# Patient Record
Sex: MALE | Race: WHITE | Employment: FULL TIME | URBAN - NONMETROPOLITAN AREA
[De-identification: names, ages, dates, MRNs, and addresses within clinical notes are randomized per-mention and may not be internally consistent; named-entity substitution may affect disease eponyms.]

---

## 2020-10-22 ENCOUNTER — HOSPITAL ENCOUNTER (OUTPATIENT)
Dept: GENERAL RADIOLOGY | Age: 35
Discharge: HOME | End: 2020-10-22
Payer: OTHER GOVERNMENT

## 2020-10-22 ENCOUNTER — HOSPITAL ENCOUNTER (OUTPATIENT)
Age: 35
Discharge: HOME | End: 2020-10-22
Payer: OTHER GOVERNMENT

## 2020-10-22 LAB
ANION GAP SERPL CALCULATED.3IONS-SCNC: 8 MEQ/L (ref 8–16)
APTT: 29.8 SECONDS (ref 22–38)
BUN BLDV-MCNC: 18 MG/DL (ref 7–22)
CALCIUM SERPL-MCNC: 10.5 MG/DL (ref 8.5–10.5)
CHLORIDE BLD-SCNC: 100 MEQ/L (ref 98–111)
CO2: 28 MEQ/L (ref 23–33)
CREAT SERPL-MCNC: 0.9 MG/DL (ref 0.4–1.2)
EKG ATRIAL RATE: 53 BPM
EKG P AXIS: -34 DEGREES
EKG P-R INTERVAL: 136 MS
EKG Q-T INTERVAL: 418 MS
EKG QRS DURATION: 122 MS
EKG QTC CALCULATION (BAZETT): 392 MS
EKG R AXIS: 94 DEGREES
EKG T AXIS: 8 DEGREES
EKG VENTRICULAR RATE: 53 BPM
ERYTHROCYTE [DISTWIDTH] IN BLOOD BY AUTOMATED COUNT: 13 % (ref 11.5–14.5)
ERYTHROCYTE [DISTWIDTH] IN BLOOD BY AUTOMATED COUNT: 43.2 FL (ref 35–45)
GFR SERPL CREATININE-BSD FRML MDRD: > 90 ML/MIN/1.73M2
GLUCOSE BLD-MCNC: 99 MG/DL (ref 70–108)
HCT VFR BLD CALC: 48.4 % (ref 42–52)
HEMOGLOBIN: 16 GM/DL (ref 14–18)
INR BLD: 1 (ref 0.85–1.13)
MCH RBC QN AUTO: 30.1 PG (ref 26–33)
MCHC RBC AUTO-ENTMCNC: 33.1 GM/DL (ref 32.2–35.5)
MCV RBC AUTO: 91.1 FL (ref 80–94)
MRSA NASAL SCREEN RT-PCR: NEGATIVE
PLATELET # BLD: 210 THOU/MM3 (ref 130–400)
PMV BLD AUTO: 11.8 FL (ref 9.4–12.4)
POTASSIUM SERPL-SCNC: 4.8 MEQ/L (ref 3.5–5.2)
RBC # BLD: 5.31 MILL/MM3 (ref 4.7–6.1)
SODIUM BLD-SCNC: 136 MEQ/L (ref 135–145)
STAPH AUREUS SCREEN RT-PCR: POSITIVE
WBC # BLD: 5.6 THOU/MM3 (ref 4.8–10.8)

## 2020-10-22 PROCEDURE — 85027 COMPLETE CBC AUTOMATED: CPT

## 2020-10-22 PROCEDURE — 36415 COLL VENOUS BLD VENIPUNCTURE: CPT

## 2020-10-22 PROCEDURE — 93010 ELECTROCARDIOGRAM REPORT: CPT | Performed by: NUCLEAR MEDICINE

## 2020-10-22 PROCEDURE — 87640 STAPH A DNA AMP PROBE: CPT

## 2020-10-22 PROCEDURE — 80048 BASIC METABOLIC PNL TOTAL CA: CPT

## 2020-10-22 PROCEDURE — 87147 CULTURE TYPE IMMUNOLOGIC: CPT

## 2020-10-22 PROCEDURE — 87081 CULTURE SCREEN ONLY: CPT

## 2020-10-22 PROCEDURE — 87641 MR-STAPH DNA AMP PROBE: CPT

## 2020-10-22 PROCEDURE — 85730 THROMBOPLASTIN TIME PARTIAL: CPT

## 2020-10-22 PROCEDURE — 85610 PROTHROMBIN TIME: CPT

## 2020-10-22 PROCEDURE — 71046 X-RAY EXAM CHEST 2 VIEWS: CPT

## 2020-10-22 PROCEDURE — 93005 ELECTROCARDIOGRAM TRACING: CPT | Performed by: ORTHOPAEDIC SURGERY

## 2020-10-24 LAB — MRSA SCREEN: NORMAL

## 2020-10-30 ENCOUNTER — HOSPITAL ENCOUNTER (OUTPATIENT)
Age: 35
Discharge: HOME | End: 2020-10-30
Payer: OTHER GOVERNMENT

## 2020-10-30 PROCEDURE — U0003 INFECTIOUS AGENT DETECTION BY NUCLEIC ACID (DNA OR RNA); SEVERE ACUTE RESPIRATORY SYNDROME CORONAVIRUS 2 (SARS-COV-2) (CORONAVIRUS DISEASE [COVID-19]), AMPLIFIED PROBE TECHNIQUE, MAKING USE OF HIGH THROUGHPUT TECHNOLOGIES AS DESCRIBED BY CMS-2020-01-R: HCPCS

## 2020-10-30 RX ORDER — M-VIT,TX,IRON,MINS/CALC/FOLIC 27MG-0.4MG
1 TABLET ORAL DAILY
COMMUNITY

## 2020-10-30 RX ORDER — MAGNESIUM OXIDE 400 MG/1
400 TABLET ORAL DAILY
COMMUNITY

## 2020-10-30 RX ORDER — ASCORBIC ACID 500 MG
500 TABLET ORAL DAILY
COMMUNITY

## 2020-10-30 NOTE — PROGRESS NOTES
Following instructions given to patient, who states understanding:     NPO after midnight  [employer] and 's license  Wear comfortable clean clothing  Do not bring jewelry   Shower night before and morning of surgery with a liquid antibacterial soap  Bring medications in original bottles  Follow all instructions given by your physician   needed at discharge  Call [phone] for any questions  Report to [facility] on 2nd floor  If you would become ill prior to surgery, please call the surgeon  May have only 1 visitor accompany you for surgery  Please bring and wear mask  You will be receiving a phone call one or two days before surgery to review covid screening exposure     Covid screening questionnaire complete and negative for symptoms or exposure see chart for documentation.      Covid test done 10/30/20 at [facility]. [facility]    Please limit your exposure to the public after you have your covid test  Please call your doctor immediately if you develop any symptoms of covid prior to your surgery

## 2020-10-30 NOTE — PROGRESS NOTES
In preparation for their surgical procedure above patient was screened for Obstructive Sleep Apnea (JUSTIN) using the STOP-Bang Questionnaire by the Pre-Admission Testing department. This is a pre-surgical screening tool for patient safety and serves as a recommendation, this WILL NOT cause cancellation of surgery. STOP-Bang Questionnaire  * Do you currently see a pulmonologist?  No     If yes STOP, do not complete. Patient follows with Dr.     1.  Do you snore loudly (able to be heard in the next room)? No    2. Do you often feel tired or sleepy during the daytime? No       3. Has anyone ever told you that you stop breathing during your sleep? No    4. Do you have or are you being treated for high blood pressure? No      5. BMI more than 35? BMI (Calculated): 32.6        No    6. Age over 48 years? 28 y.o. No    7. Neck Circumference greater than 17 inches for male or 16 inches for female? Measured           (visits only)            Not Applicable    8. Gender Male? Yes      TOTAL SCORE: 1    JUSTIN - Low Risk : Yes to 0 - 2 questions  JUSTIN - Intermediate Risk : Yes to 3 - 4 questions  JUSTIN - High Risk : Yes to 5 - 8 questions    Adapted from:   STOP Questionnaire: A Tool to Screen Patients for Obstructive Sleep Apnea   HANS Tovar.P.C., Maria G Marinelli, M.B.B.S., Bertha Felipe M.D., George Aly. Bautista Briceño, Ph.D., VINH Lopez.B.B.S., Lo Deleon, M.Sc., Mikey Bryant M.D., Sheila Duenas. [facility] Zaragoza.P[facility]    Anesthesiology 2008; 320:868-79 Copyright 2008, the [address] of Anesthesi[employer], [place] [address].   ----------------------------------------------------------------------------------------------------------------

## 2020-10-30 NOTE — PROGRESS NOTES
VICKIE Zhao that patient's nasal swab was positive tor MSSA - you may want to call in the mupirocin ointment protocol or send us the order and we will call that in for patient. Thank you.

## 2020-11-01 LAB — SARS-COV-2: DETECTED

## 2020-11-26 NOTE — H&P
History and Physical    Ronnell Winkler (1985)  11/26/2020      CHIEF COMPLAINT:  Lumbar pain    HISTORY OF PRESENT ILLNESS:    The patient is a 49-year-old male with c/o left-sided lumbar pain that radiates pain, numbness and tingling into the left buttock, posterior, lateral and anterior thigh. Patient also has numbness in the left foot and first two toes. Patients reports symptoms began 10+ yearrs ago and are related to his [employer] deployment. Current VAS score 7 out of 10. Percentage wise, 75% pain in the back and 25% left leg. Activities that aggravate the pain include standing, walking, bending forward. Activities that help relieve the pain include lying on his back. Modifying factors include medication management, PT and multiple injections. These modalities have provided only mild relief. No bowel or bladder incontinence. Patient had a prior L5-S1 microdiscectomy. Nonsmoker. Past Medical History:        Diagnosis Date    Arthritis     back     Past Surgical History:        Procedure Laterality Date    BACK SURGERY  2010    Microsiskectomy L5-S1    WISDOM TOOTH EXTRACTION  2006     Current Medications:   Prior to Admission medications    Medication Sig Start Date End Date Taking? Authorizing Provider   Multiple Vitamins-Minerals (THERAPEUTIC MULTIVITAMIN-MINERALS) tablet Take 1 tablet by mouth daily   Yes Historical Provider, MD   magnesium oxide (MAG-OX) 400 MG tablet Take 400 mg by mouth daily   Yes Historical Provider, MD   vitamin C (ASCORBIC ACID) 500 MG tablet Take 500 mg by mouth daily   Yes Historical Provider, MD    D@  Allergies:  Patient has no known allergies. Social History:   TOBACCO:   reports that he quit smoking about 7 years ago. His smoking use included cigarettes. He quit after 10.00 years of use. He has quit using smokeless tobacco.  ETOH:   reports current alcohol use of about 12.0 standard drinks of alcohol per week. DRUGS:   reports no history of drug use.   Family History:       Problem Relation Age of Onset    Colon Cancer Paternal Uncle     Colon Cancer Maternal Grandfather        REVIEW OF SYSTEMS:    CONSTITUTIONAL:  negative for fevers, chills  RESPIRATORY:  negative for cough and shortness of breath  CARDIOVASCULAR:  negative for chest pain, palpitations  GASTROINTESTINAL:  negative for nausea, vomiting, incontinence  GENITOURINARY:  negative for frequency and urinary incontinence  MUSCULOSKELETAL:  (+) for back pain, leg pain  NEUROLOGICAL:  (+) for numbness/tingling, negative for headaches  BEHAVIOR/PSYCH:  negative for depressed mood, increased anxiety    PHYSICAL EXAM:      CONSTITUTIONAL:  Awake, alert, cooperative, no apparent distress, and appears stated age  HEAD: Atraumatic, normocephalic  LUNGS:  No respiratory distress. CTA bilaterally. No wheezes, rales, rhonchi  CARDIOVASCULAR:  Regular rate and rhythm, no murmur  ABDOMEN:  Normal bowel sounds, soft, non-distended, non-tender  SPINE: Limited lumbar ROM. Inspection of the spine shows a well-healed lumbar incision. TTP lumbar spine. MUSCULOSKELETAL:  There is no redness, warmth, or swelling of the joints. Full range of motion noted. Motor strength is 5 out of 5 bilateral LE.    NEUROLOGIC:  Awake, alert, oriented to name, place and time.  Sensory is intact bilateral lower extremities (+) SLR on the left    DATA:    CBC:   Lab Results   Component Value Date    WBC 5.6 10/22/2020    RBC 5.31 10/22/2020    HGB 16.0 10/22/2020    HCT 48.4 10/22/2020    MCV 91.1 10/22/2020    MCH 30.1 10/22/2020    MCHC 33.1 10/22/2020     10/22/2020    MPV 11.8 10/22/2020     WBC:    Lab Results   Component Value Date    WBC 5.6 10/22/2020     Hemoglobin/Hematocrit:    Lab Results   Component Value Date    HGB 16.0 10/22/2020    HCT 48.4 10/22/2020     CMP:    Lab Results   Component Value Date     10/22/2020    K 4.8 10/22/2020     10/22/2020    CO2 28 10/22/2020    BUN 18 10/22/2020    CREATININE 0.9 10/22/2020    LABGLOM >90 10/22/2020    GLUCOSE 99 10/22/2020    CALCIUM 10.5 10/22/2020       Radiology:  MRI Lumbar Spine   IMPRESSION:  1. Congenital spinal stenosis  2. No acute fracture  3. Mild central canal stenosis at L2-3, L3-4, and L4-5 of multifactorial etiology  4. Severe bilateral foraminal stenosis at L5-S1  5.  Moderate bilateral foraminal stenosis at L4-5    IMPRESSION/RECOMMENDATIONS:    Assessment:   1) Prior L5-S1 discectomy  2) L5-S1 degenerative disc disease with stenosis and radiculopathy  3) L3-4 and L4-5 left-sided disc extrusions with stenosis and radiculopathy    Plan:  1) L3-S1 decompression and fusion with L5-S1 transforaminal lumbar interbody fusion    [employer]

## 2020-11-27 ENCOUNTER — HOSPITAL ENCOUNTER (INPATIENT)
Age: 35
LOS: 2 days | Discharge: HOME | DRG: 455 | End: 2020-11-29
Attending: ORTHOPAEDIC SURGERY | Admitting: ORTHOPAEDIC SURGERY
Payer: OTHER GOVERNMENT

## 2020-11-27 ENCOUNTER — APPOINTMENT (OUTPATIENT)
Dept: GENERAL RADIOLOGY | Age: 35
DRG: 455 | End: 2020-11-27
Attending: ORTHOPAEDIC SURGERY
Payer: OTHER GOVERNMENT

## 2020-11-27 ENCOUNTER — ANESTHESIA EVENT (OUTPATIENT)
Dept: OPERATING ROOM | Age: 35
DRG: 455 | End: 2020-11-27
Payer: OTHER GOVERNMENT

## 2020-11-27 ENCOUNTER — ANESTHESIA (OUTPATIENT)
Dept: OPERATING ROOM | Age: 35
DRG: 455 | End: 2020-11-27
Payer: OTHER GOVERNMENT

## 2020-11-27 VITALS
TEMPERATURE: 100.2 F | OXYGEN SATURATION: 97 % | SYSTOLIC BLOOD PRESSURE: 90 MMHG | DIASTOLIC BLOOD PRESSURE: 56 MMHG | RESPIRATION RATE: 6 BRPM

## 2020-11-27 PROBLEM — M48.07 LUMBOSACRAL SPINAL STENOSIS: Status: ACTIVE | Noted: 2020-11-27

## 2020-11-27 LAB
ABO: NORMAL
ANTIBODY SCREEN: NORMAL
RH FACTOR: NORMAL

## 2020-11-27 PROCEDURE — 0SG3071 FUSION OF LUMBOSACRAL JOINT WITH AUTOLOGOUS TISSUE SUBSTITUTE, POSTERIOR APPROACH, POSTERIOR COLUMN, OPEN APPROACH: ICD-10-PCS | Performed by: ORTHOPAEDIC SURGERY

## 2020-11-27 PROCEDURE — 2500000003 HC RX 250 WO HCPCS: Performed by: NURSE ANESTHETIST, CERTIFIED REGISTERED

## 2020-11-27 PROCEDURE — 6360000002 HC RX W HCPCS: Performed by: ANESTHESIOLOGY

## 2020-11-27 PROCEDURE — 2580000003 HC RX 258: Performed by: PHYSICIAN ASSISTANT

## 2020-11-27 PROCEDURE — 86850 RBC ANTIBODY SCREEN: CPT

## 2020-11-27 PROCEDURE — 3700000001 HC ADD 15 MINUTES (ANESTHESIA): Performed by: ORTHOPAEDIC SURGERY

## 2020-11-27 PROCEDURE — 6360000002 HC RX W HCPCS: Performed by: NURSE ANESTHETIST, CERTIFIED REGISTERED

## 2020-11-27 PROCEDURE — 2500000003 HC RX 250 WO HCPCS: Performed by: ORTHOPAEDIC SURGERY

## 2020-11-27 PROCEDURE — 6360000002 HC RX W HCPCS: Performed by: PHYSICIAN ASSISTANT

## 2020-11-27 PROCEDURE — 0SG00AJ FUSION OF LUMBAR VERTEBRAL JOINT WITH INTERBODY FUSION DEVICE, POSTERIOR APPROACH, ANTERIOR COLUMN, OPEN APPROACH: ICD-10-PCS | Performed by: ORTHOPAEDIC SURGERY

## 2020-11-27 PROCEDURE — 86901 BLOOD TYPING SEROLOGIC RH(D): CPT

## 2020-11-27 PROCEDURE — 2709999900 HC NON-CHARGEABLE SUPPLY: Performed by: ORTHOPAEDIC SURGERY

## 2020-11-27 PROCEDURE — C1713 ANCHOR/SCREW BN/BN,TIS/BN: HCPCS | Performed by: ORTHOPAEDIC SURGERY

## 2020-11-27 PROCEDURE — 01NB0ZZ RELEASE LUMBAR NERVE, OPEN APPROACH: ICD-10-PCS | Performed by: ORTHOPAEDIC SURGERY

## 2020-11-27 PROCEDURE — 2580000003 HC RX 258: Performed by: NURSE ANESTHETIST, CERTIFIED REGISTERED

## 2020-11-27 PROCEDURE — 3600000005 HC SURGERY LEVEL 5 BASE: Performed by: ORTHOPAEDIC SURGERY

## 2020-11-27 PROCEDURE — 86900 BLOOD TYPING SEROLOGIC ABO: CPT

## 2020-11-27 PROCEDURE — 2780000010 HC IMPLANT OTHER: Performed by: ORTHOPAEDIC SURGERY

## 2020-11-27 PROCEDURE — 2720000010 HC SURG SUPPLY STERILE: Performed by: ORTHOPAEDIC SURGERY

## 2020-11-27 PROCEDURE — 6370000000 HC RX 637 (ALT 250 FOR IP): Performed by: PHYSICIAN ASSISTANT

## 2020-11-27 PROCEDURE — P9045 ALBUMIN (HUMAN), 5%, 250 ML: HCPCS | Performed by: NURSE ANESTHETIST, CERTIFIED REGISTERED

## 2020-11-27 PROCEDURE — 6360000002 HC RX W HCPCS: Performed by: ORTHOPAEDIC SURGERY

## 2020-11-27 PROCEDURE — 1200000000 HC SEMI PRIVATE

## 2020-11-27 PROCEDURE — 72100 X-RAY EXAM L-S SPINE 2/3 VWS: CPT

## 2020-11-27 PROCEDURE — 7100000001 HC PACU RECOVERY - ADDTL 15 MIN: Performed by: ORTHOPAEDIC SURGERY

## 2020-11-27 PROCEDURE — 3600000015 HC SURGERY LEVEL 5 ADDTL 15MIN: Performed by: ORTHOPAEDIC SURGERY

## 2020-11-27 PROCEDURE — 3700000000 HC ANESTHESIA ATTENDED CARE: Performed by: ORTHOPAEDIC SURGERY

## 2020-11-27 PROCEDURE — 0SG1071 FUSION OF 2 OR MORE LUMBAR VERTEBRAL JOINTS WITH AUTOLOGOUS TISSUE SUBSTITUTE, POSTERIOR APPROACH, POSTERIOR COLUMN, OPEN APPROACH: ICD-10-PCS | Performed by: ORTHOPAEDIC SURGERY

## 2020-11-27 PROCEDURE — 7100000000 HC PACU RECOVERY - FIRST 15 MIN: Performed by: ORTHOPAEDIC SURGERY

## 2020-11-27 PROCEDURE — 01NR0ZZ RELEASE SACRAL NERVE, OPEN APPROACH: ICD-10-PCS | Performed by: ORTHOPAEDIC SURGERY

## 2020-11-27 PROCEDURE — 72020 X-RAY EXAM OF SPINE 1 VIEW: CPT

## 2020-11-27 PROCEDURE — 36415 COLL VENOUS BLD VENIPUNCTURE: CPT

## 2020-11-27 DEVICE — SPACER 9392512 CRESCNT SPNAL SYSTM 25X12
Type: IMPLANTABLE DEVICE | Site: SPINE LUMBAR | Status: FUNCTIONAL
Brand: CRESCENT™ SPINAL SYSTEM

## 2020-11-27 DEVICE — CROSSLINK 811-322 LP MLTSP PL L 1.752.15
Type: IMPLANTABLE DEVICE | Site: SPINE LUMBAR | Status: FUNCTIONAL
Brand: TSRH® SPINAL SYSTEM

## 2020-11-27 DEVICE — SET SCREW 5540030 5.5 TI NS BRK OFF
Type: IMPLANTABLE DEVICE | Site: SPINE LUMBAR | Status: FUNCTIONAL
Brand: CD HORIZON® SPINAL SYSTEM

## 2020-11-27 DEVICE — ALLOGRAFT STRP DBM PLF GRFT 2.5CM X 5CM: Type: IMPLANTABLE DEVICE | Site: SPINE LUMBAR | Status: FUNCTIONAL

## 2020-11-27 RX ORDER — SODIUM CHLORIDE 0.9 % (FLUSH) 0.9 %
10 SYRINGE (ML) INJECTION EVERY 12 HOURS SCHEDULED
Status: DISCONTINUED | OUTPATIENT
Start: 2020-11-27 | End: 2020-11-27 | Stop reason: HOSPADM

## 2020-11-27 RX ORDER — SENNA AND DOCUSATE SODIUM 50; 8.6 MG/1; MG/1
1 TABLET, FILM COATED ORAL 2 TIMES DAILY
Status: DISCONTINUED | OUTPATIENT
Start: 2020-11-27 | End: 2020-11-29 | Stop reason: HOSPADM

## 2020-11-27 RX ORDER — MEPERIDINE HYDROCHLORIDE 25 MG/ML
12.5 INJECTION INTRAMUSCULAR; INTRAVENOUS; SUBCUTANEOUS EVERY 5 MIN PRN
Status: DISCONTINUED | OUTPATIENT
Start: 2020-11-27 | End: 2020-11-27 | Stop reason: HOSPADM

## 2020-11-27 RX ORDER — SODIUM PHOSPHATE, DIBASIC AND SODIUM PHOSPHATE, MONOBASIC 7; 19 G/133ML; G/133ML
1 ENEMA RECTAL DAILY PRN
Status: DISCONTINUED | OUTPATIENT
Start: 2020-11-27 | End: 2020-11-29 | Stop reason: HOSPADM

## 2020-11-27 RX ORDER — LABETALOL 20 MG/4 ML (5 MG/ML) INTRAVENOUS SYRINGE
5 EVERY 10 MIN PRN
Status: DISCONTINUED | OUTPATIENT
Start: 2020-11-27 | End: 2020-11-27 | Stop reason: HOSPADM

## 2020-11-27 RX ORDER — ALBUMIN, HUMAN INJ 5% 5 %
SOLUTION INTRAVENOUS PRN
Status: DISCONTINUED | OUTPATIENT
Start: 2020-11-27 | End: 2020-11-27 | Stop reason: SDUPTHER

## 2020-11-27 RX ORDER — GLYCOPYRROLATE 1 MG/5 ML
SYRINGE (ML) INTRAVENOUS PRN
Status: DISCONTINUED | OUTPATIENT
Start: 2020-11-27 | End: 2020-11-27 | Stop reason: SDUPTHER

## 2020-11-27 RX ORDER — NEOSTIGMINE METHYLSULFATE 5 MG/5 ML
SYRINGE (ML) INTRAVENOUS PRN
Status: DISCONTINUED | OUTPATIENT
Start: 2020-11-27 | End: 2020-11-27 | Stop reason: SDUPTHER

## 2020-11-27 RX ORDER — PROPOFOL 10 MG/ML
INJECTION, EMULSION INTRAVENOUS PRN
Status: DISCONTINUED | OUTPATIENT
Start: 2020-11-27 | End: 2020-11-27 | Stop reason: SDUPTHER

## 2020-11-27 RX ORDER — MIDAZOLAM HYDROCHLORIDE 1 MG/ML
INJECTION INTRAMUSCULAR; INTRAVENOUS PRN
Status: DISCONTINUED | OUTPATIENT
Start: 2020-11-27 | End: 2020-11-27 | Stop reason: SDUPTHER

## 2020-11-27 RX ORDER — DEXAMETHASONE SODIUM PHOSPHATE 10 MG/ML
INJECTION, EMULSION INTRAMUSCULAR; INTRAVENOUS PRN
Status: DISCONTINUED | OUTPATIENT
Start: 2020-11-27 | End: 2020-11-27 | Stop reason: SDUPTHER

## 2020-11-27 RX ORDER — ROCURONIUM BROMIDE 10 MG/ML
INJECTION, SOLUTION INTRAVENOUS PRN
Status: DISCONTINUED | OUTPATIENT
Start: 2020-11-27 | End: 2020-11-27 | Stop reason: SDUPTHER

## 2020-11-27 RX ORDER — ONDANSETRON 2 MG/ML
4 INJECTION INTRAMUSCULAR; INTRAVENOUS EVERY 6 HOURS PRN
Status: DISCONTINUED | OUTPATIENT
Start: 2020-11-27 | End: 2020-11-29 | Stop reason: HOSPADM

## 2020-11-27 RX ORDER — SODIUM CHLORIDE 9 MG/ML
INJECTION, SOLUTION INTRAVENOUS CONTINUOUS
Status: DISCONTINUED | OUTPATIENT
Start: 2020-11-27 | End: 2020-11-28

## 2020-11-27 RX ORDER — FENTANYL CITRATE 50 UG/ML
25 INJECTION, SOLUTION INTRAMUSCULAR; INTRAVENOUS EVERY 5 MIN PRN
Status: DISCONTINUED | OUTPATIENT
Start: 2020-11-27 | End: 2020-11-27 | Stop reason: HOSPADM

## 2020-11-27 RX ORDER — SODIUM CHLORIDE, SODIUM LACTATE, POTASSIUM CHLORIDE, CALCIUM CHLORIDE 600; 310; 30; 20 MG/100ML; MG/100ML; MG/100ML; MG/100ML
INJECTION, SOLUTION INTRAVENOUS CONTINUOUS PRN
Status: DISCONTINUED | OUTPATIENT
Start: 2020-11-27 | End: 2020-11-27 | Stop reason: SDUPTHER

## 2020-11-27 RX ORDER — POLYETHYLENE GLYCOL 3350 17 G/17G
17 POWDER, FOR SOLUTION ORAL DAILY
Status: DISCONTINUED | OUTPATIENT
Start: 2020-11-27 | End: 2020-11-29 | Stop reason: HOSPADM

## 2020-11-27 RX ORDER — EPHEDRINE SULFATE/0.9% NACL/PF 50 MG/5 ML
SYRINGE (ML) INTRAVENOUS PRN
Status: DISCONTINUED | OUTPATIENT
Start: 2020-11-27 | End: 2020-11-27 | Stop reason: SDUPTHER

## 2020-11-27 RX ORDER — SODIUM CHLORIDE 0.9 % (FLUSH) 0.9 %
10 SYRINGE (ML) INJECTION EVERY 12 HOURS SCHEDULED
Status: DISCONTINUED | OUTPATIENT
Start: 2020-11-27 | End: 2020-11-29 | Stop reason: HOSPADM

## 2020-11-27 RX ORDER — PROMETHAZINE HYDROCHLORIDE 25 MG/ML
12.5 INJECTION, SOLUTION INTRAMUSCULAR; INTRAVENOUS
Status: DISCONTINUED | OUTPATIENT
Start: 2020-11-27 | End: 2020-11-27 | Stop reason: HOSPADM

## 2020-11-27 RX ORDER — LIDOCAINE HCL/PF 100 MG/5ML
SYRINGE (ML) INJECTION PRN
Status: DISCONTINUED | OUTPATIENT
Start: 2020-11-27 | End: 2020-11-27 | Stop reason: SDUPTHER

## 2020-11-27 RX ORDER — SODIUM CHLORIDE 0.9 % (FLUSH) 0.9 %
10 SYRINGE (ML) INJECTION PRN
Status: DISCONTINUED | OUTPATIENT
Start: 2020-11-27 | End: 2020-11-29 | Stop reason: HOSPADM

## 2020-11-27 RX ORDER — CEFAZOLIN SODIUM 1 G/3ML
INJECTION, POWDER, FOR SOLUTION INTRAMUSCULAR; INTRAVENOUS PRN
Status: DISCONTINUED | OUTPATIENT
Start: 2020-11-27 | End: 2020-11-27 | Stop reason: SDUPTHER

## 2020-11-27 RX ORDER — MORPHINE SULFATE 4 MG/ML
4 INJECTION, SOLUTION INTRAMUSCULAR; INTRAVENOUS
Status: DISCONTINUED | OUTPATIENT
Start: 2020-11-27 | End: 2020-11-29 | Stop reason: HOSPADM

## 2020-11-27 RX ORDER — HYDROMORPHONE HCL 110MG/55ML
PATIENT CONTROLLED ANALGESIA SYRINGE INTRAVENOUS PRN
Status: DISCONTINUED | OUTPATIENT
Start: 2020-11-27 | End: 2020-11-27 | Stop reason: SDUPTHER

## 2020-11-27 RX ORDER — BISACODYL 10 MG
10 SUPPOSITORY, RECTAL RECTAL DAILY PRN
Status: DISCONTINUED | OUTPATIENT
Start: 2020-11-27 | End: 2020-11-29 | Stop reason: HOSPADM

## 2020-11-27 RX ORDER — OXYCODONE HYDROCHLORIDE AND ACETAMINOPHEN 5; 325 MG/1; MG/1
1 TABLET ORAL EVERY 6 HOURS PRN
Status: DISCONTINUED | OUTPATIENT
Start: 2020-11-27 | End: 2020-11-29 | Stop reason: HOSPADM

## 2020-11-27 RX ORDER — CYCLOBENZAPRINE HCL 10 MG
10 TABLET ORAL 3 TIMES DAILY PRN
Status: DISCONTINUED | OUTPATIENT
Start: 2020-11-27 | End: 2020-11-29 | Stop reason: HOSPADM

## 2020-11-27 RX ORDER — FENTANYL CITRATE 50 UG/ML
INJECTION, SOLUTION INTRAMUSCULAR; INTRAVENOUS PRN
Status: DISCONTINUED | OUTPATIENT
Start: 2020-11-27 | End: 2020-11-27 | Stop reason: SDUPTHER

## 2020-11-27 RX ORDER — SODIUM CHLORIDE 9 MG/ML
INJECTION, SOLUTION INTRAVENOUS CONTINUOUS
Status: DISCONTINUED | OUTPATIENT
Start: 2020-11-27 | End: 2020-11-27

## 2020-11-27 RX ORDER — PROMETHAZINE HYDROCHLORIDE 25 MG/1
12.5 TABLET ORAL EVERY 6 HOURS PRN
Status: DISCONTINUED | OUTPATIENT
Start: 2020-11-27 | End: 2020-11-29 | Stop reason: HOSPADM

## 2020-11-27 RX ORDER — FENTANYL CITRATE 50 UG/ML
50 INJECTION, SOLUTION INTRAMUSCULAR; INTRAVENOUS EVERY 5 MIN PRN
Status: DISCONTINUED | OUTPATIENT
Start: 2020-11-27 | End: 2020-11-27 | Stop reason: HOSPADM

## 2020-11-27 RX ORDER — OXYCODONE HYDROCHLORIDE AND ACETAMINOPHEN 5; 325 MG/1; MG/1
1 TABLET ORAL EVERY 4 HOURS PRN
Status: ON HOLD | COMMUNITY
End: 2020-11-29 | Stop reason: SDUPTHER

## 2020-11-27 RX ORDER — VANCOMYCIN HYDROCHLORIDE 1 G/20ML
INJECTION, POWDER, LYOPHILIZED, FOR SOLUTION INTRAVENOUS PRN
Status: DISCONTINUED | OUTPATIENT
Start: 2020-11-27 | End: 2020-11-27 | Stop reason: ALTCHOICE

## 2020-11-27 RX ORDER — SODIUM CHLORIDE 0.9 % (FLUSH) 0.9 %
10 SYRINGE (ML) INJECTION PRN
Status: DISCONTINUED | OUTPATIENT
Start: 2020-11-27 | End: 2020-11-27 | Stop reason: HOSPADM

## 2020-11-27 RX ORDER — OXYCODONE HYDROCHLORIDE AND ACETAMINOPHEN 5; 325 MG/1; MG/1
2 TABLET ORAL EVERY 6 HOURS PRN
Status: DISCONTINUED | OUTPATIENT
Start: 2020-11-27 | End: 2020-11-29 | Stop reason: HOSPADM

## 2020-11-27 RX ORDER — MORPHINE SULFATE 2 MG/ML
2 INJECTION, SOLUTION INTRAMUSCULAR; INTRAVENOUS
Status: DISCONTINUED | OUTPATIENT
Start: 2020-11-27 | End: 2020-11-29 | Stop reason: HOSPADM

## 2020-11-27 RX ORDER — ONDANSETRON 2 MG/ML
INJECTION INTRAMUSCULAR; INTRAVENOUS PRN
Status: DISCONTINUED | OUTPATIENT
Start: 2020-11-27 | End: 2020-11-27 | Stop reason: SDUPTHER

## 2020-11-27 RX ADMIN — ONDANSETRON HYDROCHLORIDE 4 MG: 4 INJECTION, SOLUTION INTRAMUSCULAR; INTRAVENOUS at 13:04

## 2020-11-27 RX ADMIN — ALBUMIN (HUMAN) 12.5 G: 12.5 SOLUTION INTRAVENOUS at 11:59

## 2020-11-27 RX ADMIN — HYDROMORPHONE HYDROCHLORIDE 0.5 MG: 2 INJECTION INTRAMUSCULAR; INTRAVENOUS; SUBCUTANEOUS at 14:11

## 2020-11-27 RX ADMIN — DEXAMETHASONE SODIUM PHOSPHATE 10 MG: 10 INJECTION, EMULSION INTRAMUSCULAR; INTRAVENOUS at 09:39

## 2020-11-27 RX ADMIN — ROCURONIUM BROMIDE 10 MG: 10 INJECTION INTRAVENOUS at 11:49

## 2020-11-27 RX ADMIN — Medication 10 MG: at 10:13

## 2020-11-27 RX ADMIN — SODIUM CHLORIDE, POTASSIUM CHLORIDE, SODIUM LACTATE AND CALCIUM CHLORIDE: 600; 310; 30; 20 INJECTION, SOLUTION INTRAVENOUS at 11:59

## 2020-11-27 RX ADMIN — Medication 10 MG: at 11:36

## 2020-11-27 RX ADMIN — PROPOFOL 200 MG: 10 INJECTION, EMULSION INTRAVENOUS at 09:30

## 2020-11-27 RX ADMIN — OXYCODONE HYDROCHLORIDE AND ACETAMINOPHEN 2 TABLET: 5; 325 TABLET ORAL at 18:03

## 2020-11-27 RX ADMIN — ROCURONIUM BROMIDE 20 MG: 10 INJECTION INTRAVENOUS at 09:45

## 2020-11-27 RX ADMIN — Medication 10 MG: at 11:58

## 2020-11-27 RX ADMIN — CEFAZOLIN 2000 MG: 1 INJECTION, POWDER, FOR SOLUTION INTRAMUSCULAR; INTRAVENOUS; PARENTERAL at 13:33

## 2020-11-27 RX ADMIN — FENTANYL CITRATE 100 MCG: 50 INJECTION INTRAMUSCULAR; INTRAVENOUS at 10:05

## 2020-11-27 RX ADMIN — Medication 10 MG: at 11:35

## 2020-11-27 RX ADMIN — FENTANYL CITRATE 150 MCG: 50 INJECTION INTRAMUSCULAR; INTRAVENOUS at 09:30

## 2020-11-27 RX ADMIN — Medication 0.6 MG: at 13:48

## 2020-11-27 RX ADMIN — HYDROMORPHONE HYDROCHLORIDE 0.5 MG: 2 INJECTION INTRAMUSCULAR; INTRAVENOUS; SUBCUTANEOUS at 13:04

## 2020-11-27 RX ADMIN — SODIUM CHLORIDE, POTASSIUM CHLORIDE, SODIUM LACTATE AND CALCIUM CHLORIDE: 600; 310; 30; 20 INJECTION, SOLUTION INTRAVENOUS at 10:14

## 2020-11-27 RX ADMIN — SODIUM CHLORIDE: 9 INJECTION, SOLUTION INTRAVENOUS at 08:02

## 2020-11-27 RX ADMIN — CEFAZOLIN SODIUM 2 G: 10 INJECTION, POWDER, FOR SOLUTION INTRAVENOUS at 18:03

## 2020-11-27 RX ADMIN — HYDROMORPHONE HYDROCHLORIDE 0.5 MG: 2 INJECTION INTRAMUSCULAR; INTRAVENOUS; SUBCUTANEOUS at 14:05

## 2020-11-27 RX ADMIN — Medication 3 MG: at 13:48

## 2020-11-27 RX ADMIN — ROCURONIUM BROMIDE 50 MG: 10 INJECTION INTRAVENOUS at 09:30

## 2020-11-27 RX ADMIN — Medication 10 MG: at 11:33

## 2020-11-27 RX ADMIN — SODIUM CHLORIDE: 9 INJECTION, SOLUTION INTRAVENOUS at 15:47

## 2020-11-27 RX ADMIN — DOCUSATE SODIUM 50 MG AND SENNOSIDES 8.6 MG 1 TABLET: 8.6; 5 TABLET, FILM COATED ORAL at 21:17

## 2020-11-27 RX ADMIN — Medication 100 MG: at 09:30

## 2020-11-27 RX ADMIN — CYCLOBENZAPRINE 10 MG: 10 TABLET, FILM COATED ORAL at 23:29

## 2020-11-27 RX ADMIN — ROCURONIUM BROMIDE 20 MG: 10 INJECTION INTRAVENOUS at 10:15

## 2020-11-27 RX ADMIN — MIDAZOLAM HYDROCHLORIDE 2 MG: 1 INJECTION, SOLUTION INTRAMUSCULAR; INTRAVENOUS at 09:27

## 2020-11-27 RX ADMIN — HYDROMORPHONE HYDROCHLORIDE 0.5 MG: 2 INJECTION INTRAMUSCULAR; INTRAVENOUS; SUBCUTANEOUS at 11:56

## 2020-11-27 RX ADMIN — CEFAZOLIN 2 G: 10 INJECTION, POWDER, FOR SOLUTION INTRAVENOUS at 09:41

## 2020-11-27 RX ADMIN — HYDROMORPHONE HYDROCHLORIDE 0.5 MG: 1 INJECTION, SOLUTION INTRAMUSCULAR; INTRAVENOUS; SUBCUTANEOUS at 14:40

## 2020-11-27 RX ADMIN — SODIUM CHLORIDE: 9 INJECTION, SOLUTION INTRAVENOUS at 11:05

## 2020-11-27 ASSESSMENT — PULMONARY FUNCTION TESTS
PIF_VALUE: 20
PIF_VALUE: 22
PIF_VALUE: 20
PIF_VALUE: 20
PIF_VALUE: 25
PIF_VALUE: 2
PIF_VALUE: 25
PIF_VALUE: 20
PIF_VALUE: 21
PIF_VALUE: 21
PIF_VALUE: 25
PIF_VALUE: 20
PIF_VALUE: 20
PIF_VALUE: 25
PIF_VALUE: 7
PIF_VALUE: 21
PIF_VALUE: 25
PIF_VALUE: 19
PIF_VALUE: 21
PIF_VALUE: 19
PIF_VALUE: 25
PIF_VALUE: 20
PIF_VALUE: 25
PIF_VALUE: 20
PIF_VALUE: 25
PIF_VALUE: 25
PIF_VALUE: 20
PIF_VALUE: 25
PIF_VALUE: 25
PIF_VALUE: 19
PIF_VALUE: 25
PIF_VALUE: 20
PIF_VALUE: 17
PIF_VALUE: 25
PIF_VALUE: 2
PIF_VALUE: 20
PIF_VALUE: 25
PIF_VALUE: 25
PIF_VALUE: 20
PIF_VALUE: 25
PIF_VALUE: 25
PIF_VALUE: 26
PIF_VALUE: 25
PIF_VALUE: 20
PIF_VALUE: 25
PIF_VALUE: 20
PIF_VALUE: 25
PIF_VALUE: 25
PIF_VALUE: 21
PIF_VALUE: 25
PIF_VALUE: 20
PIF_VALUE: 21
PIF_VALUE: 25
PIF_VALUE: 21
PIF_VALUE: 16
PIF_VALUE: 25
PIF_VALUE: 21
PIF_VALUE: 21
PIF_VALUE: 25
PIF_VALUE: 20
PIF_VALUE: 17
PIF_VALUE: 18
PIF_VALUE: 20
PIF_VALUE: 25
PIF_VALUE: 25
PIF_VALUE: 20
PIF_VALUE: 25
PIF_VALUE: 20
PIF_VALUE: 21
PIF_VALUE: 17
PIF_VALUE: 25
PIF_VALUE: 25
PIF_VALUE: 20
PIF_VALUE: 25
PIF_VALUE: 20
PIF_VALUE: 21
PIF_VALUE: 25
PIF_VALUE: 25
PIF_VALUE: 20
PIF_VALUE: 25
PIF_VALUE: 25
PIF_VALUE: 16
PIF_VALUE: 21
PIF_VALUE: 21
PIF_VALUE: 20
PIF_VALUE: 20
PIF_VALUE: 25
PIF_VALUE: 21
PIF_VALUE: 20
PIF_VALUE: 26
PIF_VALUE: 20
PIF_VALUE: 21
PIF_VALUE: 26
PIF_VALUE: 19
PIF_VALUE: 25
PIF_VALUE: 22
PIF_VALUE: 25
PIF_VALUE: 16
PIF_VALUE: 21
PIF_VALUE: 2
PIF_VALUE: 25
PIF_VALUE: 21
PIF_VALUE: 20
PIF_VALUE: 15
PIF_VALUE: 25
PIF_VALUE: 16
PIF_VALUE: 20
PIF_VALUE: 21
PIF_VALUE: 25
PIF_VALUE: 25
PIF_VALUE: 2
PIF_VALUE: 25
PIF_VALUE: 20
PIF_VALUE: 20
PIF_VALUE: 1
PIF_VALUE: 25
PIF_VALUE: 25
PIF_VALUE: 20
PIF_VALUE: 20
PIF_VALUE: 40
PIF_VALUE: 25
PIF_VALUE: 20
PIF_VALUE: 25
PIF_VALUE: 20
PIF_VALUE: 0
PIF_VALUE: 25
PIF_VALUE: 18
PIF_VALUE: 20
PIF_VALUE: 8
PIF_VALUE: 18
PIF_VALUE: 20
PIF_VALUE: 25
PIF_VALUE: 20
PIF_VALUE: 20
PIF_VALUE: 25
PIF_VALUE: 20
PIF_VALUE: 20
PIF_VALUE: 25
PIF_VALUE: 6
PIF_VALUE: 25
PIF_VALUE: 25
PIF_VALUE: 20
PIF_VALUE: 25
PIF_VALUE: 21
PIF_VALUE: 17
PIF_VALUE: 20
PIF_VALUE: 25
PIF_VALUE: 25
PIF_VALUE: 6
PIF_VALUE: 7
PIF_VALUE: 25
PIF_VALUE: 25
PIF_VALUE: 21
PIF_VALUE: 21
PIF_VALUE: 25
PIF_VALUE: 21
PIF_VALUE: 25
PIF_VALUE: 20
PIF_VALUE: 25
PIF_VALUE: 25
PIF_VALUE: 21
PIF_VALUE: 20
PIF_VALUE: 25
PIF_VALUE: 20
PIF_VALUE: 25
PIF_VALUE: 0
PIF_VALUE: 20
PIF_VALUE: 20
PIF_VALUE: 25
PIF_VALUE: 20
PIF_VALUE: 25
PIF_VALUE: 20
PIF_VALUE: 19
PIF_VALUE: 20
PIF_VALUE: 25
PIF_VALUE: 26
PIF_VALUE: 20
PIF_VALUE: 21
PIF_VALUE: 25
PIF_VALUE: 8
PIF_VALUE: 20
PIF_VALUE: 25
PIF_VALUE: 25
PIF_VALUE: 11
PIF_VALUE: 18
PIF_VALUE: 25
PIF_VALUE: 25
PIF_VALUE: 20
PIF_VALUE: 17
PIF_VALUE: 20
PIF_VALUE: 19
PIF_VALUE: 20
PIF_VALUE: 25
PIF_VALUE: 4
PIF_VALUE: 21
PIF_VALUE: 26
PIF_VALUE: 25
PIF_VALUE: 14
PIF_VALUE: 26
PIF_VALUE: 8
PIF_VALUE: 25
PIF_VALUE: 25
PIF_VALUE: 20
PIF_VALUE: 20
PIF_VALUE: 25
PIF_VALUE: 20
PIF_VALUE: 25
PIF_VALUE: 25
PIF_VALUE: 19
PIF_VALUE: 21
PIF_VALUE: 21
PIF_VALUE: 25
PIF_VALUE: 3
PIF_VALUE: 25
PIF_VALUE: 20
PIF_VALUE: 25
PIF_VALUE: 21
PIF_VALUE: 25
PIF_VALUE: 25
PIF_VALUE: 20
PIF_VALUE: 25
PIF_VALUE: 20
PIF_VALUE: 20
PIF_VALUE: 14
PIF_VALUE: 20
PIF_VALUE: 21
PIF_VALUE: 21
PIF_VALUE: 25
PIF_VALUE: 25
PIF_VALUE: 19
PIF_VALUE: 25
PIF_VALUE: 25
PIF_VALUE: 20
PIF_VALUE: 2
PIF_VALUE: 25
PIF_VALUE: 21
PIF_VALUE: 25
PIF_VALUE: 20
PIF_VALUE: 25
PIF_VALUE: 18
PIF_VALUE: 18
PIF_VALUE: 20
PIF_VALUE: 25
PIF_VALUE: 20

## 2020-11-27 ASSESSMENT — PAIN SCALES - GENERAL
PAINLEVEL_OUTOF10: 4
PAINLEVEL_OUTOF10: 0
PAINLEVEL_OUTOF10: 7
PAINLEVEL_OUTOF10: 0

## 2020-11-27 ASSESSMENT — PAIN DESCRIPTION - ORIENTATION: ORIENTATION: MID

## 2020-11-27 ASSESSMENT — PAIN DESCRIPTION - PROGRESSION: CLINICAL_PROGRESSION: GRADUALLY WORSENING

## 2020-11-27 ASSESSMENT — PAIN DESCRIPTION - PAIN TYPE: TYPE: SURGICAL PAIN

## 2020-11-27 ASSESSMENT — PAIN DESCRIPTION - DESCRIPTORS: DESCRIPTORS: ACHING

## 2020-11-27 ASSESSMENT — PAIN DESCRIPTION - ONSET: ONSET: ON-GOING

## 2020-11-27 ASSESSMENT — PAIN DESCRIPTION - FREQUENCY: FREQUENCY: CONTINUOUS

## 2020-11-27 ASSESSMENT — PAIN DESCRIPTION - LOCATION: LOCATION: BACK

## 2020-11-27 NOTE — ANESTHESIA POSTPROCEDURE EVALUATION
Department of Anesthesiology  Postprocedure Note    Patient: Bren Wells  MRN: [id number]  YOB: 1985  Date of evaluation: 11/27/2020  Time:  4:18 PM     Procedure Summary     Date:  11/27/20 Room / Location:  [address] [place]    Anesthesia Start:  0927 Anesthesia Stop:  1975    Procedure:  L3-S1 DECOMPRESSION L3-S1 PSF L5-S1 TLIF (N/A Spine Lumbar) Diagnosis:  (LUMBOSACRAL SPINAL STENOSIS)    Surgeon:  Barbara Eason MD Responsible Provider:  Rajwinder De Oliveira DO    Anesthesia Type:  general ASA Status:  1          Anesthesia Type: general    Omar Phase I: Omar Score: 9    Omar Phase II:      Last vitals: Reviewed and per EMR flowsheets.        Anesthesia Post Evaluation    Patient location during evaluation: PACU  Patient participation: complete - patient participated  Level of consciousness: awake  Airway patency: patent  Nausea & Vomiting: no nausea and no vomiting  Complications: no  Cardiovascular status: hemodynamically stable  Respiratory status: acceptable  Hydration status: stable

## 2020-11-27 NOTE — OP NOTE
Operative Note      Patient: Cydney Kelley  YOB: 1985  MRN: [id number]                    ACCOUNT NO: [de-identified]                               ROOM: 012  ADMISSION DATE: 11/27/2020    PROVIDER:  Abdulaziz Gay. Vicky Dailey M.D.     DATE OF PROCEDURE: 11/27/2020     PREOPERATIVE DIAGNOSES:  1. Prior L5-S1 discectomy  2. L3-S1 degenerative disc disease. 3.  L3-5 disc extrusions  4. L3-S1 lumbar stenosis with radiculopathy  5. Obesity, BMI 35.02     POSTOPERATIVE DIAGNOSES:  1. Prior L5-S1 discectomy  2. L3-S1 degenerative disc disease. 3.  L3-5 disc extrusions  4. L3-S1 lumbar stenosis with radiculopathy  5. Obesity, BMI 35.02     OPERATION PERFORMED:  1. L3-S1 bilateral laminectomy, partial medial facetectomies and foraminotomies of L3, L4, L5, and S1 nerve roots   2. L3-S1 posterior spinal fusion. 3.  L3-S1 posterior spinal instrumentation, Legacy, [employer]. 4. L4-5 transforaminal lumbar interbody fusion, anterior posterior fusion using a single posterior approach  5.  12 x 25 mm PEEK [place] cage  6. Use of local autograft bone and Florence Matrix      SURGEON: Héctor Laboy MD    ASSISTANT:  Enma Naqvi PA-C and Dr. Leticia Quintero MD  They assisted throughout the procedure with positioning, draping, retraction, wound closure, and dressing application. ANESTHESIA:  General.     INDICATIONS:  This is a 28 y.o. male with refractory back and left leg pain, numbness and tingling from degenerative disc disease and lumbar stenosis from L3-S1. Patient has failed full conservative therapy including medication management, physical therapy, and epidural steroid injections. Due to the persistence of symptoms and reduction in the ADLs, patient elected surgical treatment. Patient, therefore, understood indications for the surgery as well as its risks, benefits, and alternatives.   These risks include but are not limited to paralysis, infection, hematoma, dural tear, nerve root injury, nonunion, DVT/PE, stroke, MI, death etc.  All questions were answered. Informed consent was obtained. OPERATIVE PROCEDURE:  The patient was taken to the operating room by the Anesthesiology Service and had satisfactory general anesthesia. A first-generation cephalosporin was given within 1 hour of surgical incision. 2 gm of cefazolin was given IV. Venous thromboembolic prophylaxis was performed with sequential devices. Abdullahi was placed using standard sterile technique. The patient was then positioned prone on a standard OSI frame with the abdomen hanging free and all bony prominences well padded. The low back was then prepped and draped in its entirety in the usual sterile fashion. Before incision, a formal timeout was taken per protocol. We next took a midline longitudinal approach and performed subperiosteal dissection out to the tips of the transverse processes of L3, L4, L5, and S1. Intraoperative localization of level was confirmed using anatomic landmarks. We then began the laminectomy as well as decompression by removing the spinous process of L3, L4 and L5. We entered the spinal canal, resecting the ligamentum flavum in its entirety over this region. Microcurettes were used to remove scar tissue from his prior operation. Patient had significant stenosis in the lateral recess and neural foramina. Partial medial facetectomy was then performed with an osteotome to get lateral to the facet overhang, but just medial to the pedicles and nerve roots. Kerrison's were then used to perform foraminotomies of the L3, L4, L5, and S1 nerve roots bilaterally. In this way, we completed decompression at L3-4, L4-5 and L5-S1 with foraminotomies of the L3, L4, L5, and S1 nerve roots bilaterally. Satisfied with this, we then turned our attention to perform interbody fusion at L4-5. The left L5 pedicle was skeletonized to give us as much working room at the L4-5 disk space.   The disk spaces were then cleaned out with operation. Implants:  Implant Name Type Inv. Item Serial No.  Lot No. LRB No. Used Action   SPACER SPNL [id number] PEEK INTBDY BODY FUS CRESC  SPACER SPNL [id number] PEEK INTBDY BODY FUS CRESC  [employer] SOFAMOR DANEK-WD [id number] N/A 1 Implanted   ALLOGRAFT STRP DBM PLF GRFT 2.5CM X 5CM - [id number]  ALLOGRAFT STRP DBM PLF GRFT 2.5CM X 5CM [id number] [employer] SPINALGRAFT TECH-WD  N/A 1 Implanted   SCREW SPNL L50MM DIA7. 5MM CANC PEDCL TI MULTIAXIAL TOP LD  SCREW SPNL L50MM Radene Rice. 5MM CANC PEDCL TI MULTIAXIAL TOP LD  MEDTR[employer]IC SOFAMOR DANEK-WD  N/A 6 Implanted   SCREW SPNL L55MM DIA7. 5MM CANC PEDCL TI MULTIAXIAL TOP LD  SCREW SPNL L55MM Radene Rice. 5MM CANC PEDCL TI MULTIAXIAL TOP LD  MED[employer] SOFAMOR DANEK-WD  N/A 2 Implanted   ROSARIO SPNL L90MM DIA5. 5MM THORACOLUMBOSACRAL TI PREBENT [address] [employer]  ROSARIO SPNL L90MM DIA5. 5MM THORACOLUMBOSACRAL TI PREBENT [address]  MEDTRONIC SOFAMOR DANEK-WD  N/A 2 Implanted   SET SCR SPNL L6MM DIA5. 5MM TI BRK OFF CYNTHIA W/ DETACH [address]  SET SCR SPNL L6MM DIA5. 5MM TI BRK OFF CYNTHIA W/ DETACH [address]  MED[employer] SOFAMOR DANEK-WD  N/A 8 Implanted   PLATE SPNL Y3.83-2. 15IN DIA5. 5MM THORLUM TI LO PROF  PLATE SPNL A3.05-2. 15IN DIA5. 5MM THORLUM TI LO PROF  [employer] SOFAMOR DANEK-WD  N/A 1 Implanted           COMPLICATIONS:  None. SPECIMENS:  None. ESTIMATED BLOOD LOSS:  600 mL. POSTOPERATIVE CARE:  The patient will be recovered in PACU and then a regular nursing floor. Once the drainage is low and pain is under control, patient will be discharged home per clinical indication. Patient will follow up in the office in 6 weeks. At that time, AP and lateral x-rays of the lumbar spine will be obtained to assess instrumentation and fusion. MODIFIER 22:  Due to the patient's BMI greater than 30, modifier 22 will be applied due to the patient's case taking 50% longer due to poor visualization and orientation.       Electronically signed by Emmett Colvin MD on 11/27/2020 at 1:15 PM

## 2020-11-27 NOTE — ANESTHESIA PRE PROCEDURE
Department of Anesthesiology  Preprocedure Note       Name:  Danilo Ma   Age:  28 y.o.  :  1985                                          MRN:  [id number]         Date:  2020      Surgeon: Zahraa Sheldon):  Brielle Blanchard MD    Procedure: Procedure(s):  L3-S1 DECOMPRESSION L3-S1 PSF L5-S1 TLIF    Medications prior to admission:   Prior to Admission medications    Medication Sig Start Date End Date Taking? Authorizing Provider   Multiple Vitamins-Minerals (THERAPEUTIC MULTIVITAMIN-MINERALS) tablet Take 1 tablet by mouth daily   Yes Historical Provider, MD   magnesium oxide (MAG-OX) 400 MG tablet Take 400 mg by mouth daily   Yes Historical Provider, MD   vitamin C (ASCORBIC ACID) 500 MG tablet Take 500 mg by mouth daily   Yes Historical Provider, MD       Current medications:    Current Facility-Administered Medications   Medication Dose Route Frequency Provider Last Rate Last Dose    0.9 % sodium chloride infusion   Intravenous Continuous FERNIE Guerrier        sodium chloride flush 0.9 % injection 10 mL  10 mL Intravenous 2 times per day FERNIE Pace        sodium chloride flush 0.9 % injection 10 mL  10 mL Intravenous PRN FERNIE Pace        ceFAZolin (ANCEF) 2 g in dextrose 5 % 50 mL IVPB  2 g Intravenous On Call to FERNIE Roe           Allergies:  No Known Allergies    Problem List:  There is no problem list on file for this patient. Past Medical History:        Diagnosis Date    Arthritis     back       Past Surgical History:        Procedure Laterality Date    BACK SURGERY  2010    Microsiskectomy L5-S1    WISDOM TOOTH EXTRACTION         Social History:    Social History     Tobacco Use    Smoking status: Former Smoker     Years: 10.00     Types: Cigarettes     Last attempt to quit:      Years since quittin.9    Smokeless tobacco: Former User   Substance Use Topics    Alcohol use:  Yes     Alcohol/week: 12.0 standard drinks     Types: 12 Cans of

## 2020-11-28 LAB
ANION GAP SERPL CALCULATED.3IONS-SCNC: 11 MEQ/L (ref 8–16)
BUN BLDV-MCNC: 11 MG/DL (ref 7–22)
CALCIUM SERPL-MCNC: 8.9 MG/DL (ref 8.5–10.5)
CHLORIDE BLD-SCNC: 103 MEQ/L (ref 98–111)
CO2: 25 MEQ/L (ref 23–33)
CREAT SERPL-MCNC: 0.9 MG/DL (ref 0.4–1.2)
GFR SERPL CREATININE-BSD FRML MDRD: > 90 ML/MIN/1.73M2
GLUCOSE BLD-MCNC: 103 MG/DL (ref 70–108)
HCT VFR BLD CALC: 34.7 % (ref 42–52)
HEMOGLOBIN: 11.8 GM/DL (ref 14–18)
POTASSIUM SERPL-SCNC: 4.1 MEQ/L (ref 3.5–5.2)
SODIUM BLD-SCNC: 139 MEQ/L (ref 135–145)

## 2020-11-28 PROCEDURE — 6370000000 HC RX 637 (ALT 250 FOR IP): Performed by: PHYSICIAN ASSISTANT

## 2020-11-28 PROCEDURE — 6360000002 HC RX W HCPCS: Performed by: PHYSICIAN ASSISTANT

## 2020-11-28 PROCEDURE — 97165 OT EVAL LOW COMPLEX 30 MIN: CPT

## 2020-11-28 PROCEDURE — 85014 HEMATOCRIT: CPT

## 2020-11-28 PROCEDURE — 94760 N-INVAS EAR/PLS OXIMETRY 1: CPT

## 2020-11-28 PROCEDURE — 97110 THERAPEUTIC EXERCISES: CPT

## 2020-11-28 PROCEDURE — 1200000000 HC SEMI PRIVATE

## 2020-11-28 PROCEDURE — 36415 COLL VENOUS BLD VENIPUNCTURE: CPT

## 2020-11-28 PROCEDURE — 97161 PT EVAL LOW COMPLEX 20 MIN: CPT

## 2020-11-28 PROCEDURE — 97116 GAIT TRAINING THERAPY: CPT

## 2020-11-28 PROCEDURE — 85018 HEMOGLOBIN: CPT

## 2020-11-28 PROCEDURE — 80048 BASIC METABOLIC PNL TOTAL CA: CPT

## 2020-11-28 RX ADMIN — OXYCODONE HYDROCHLORIDE AND ACETAMINOPHEN 2 TABLET: 5; 325 TABLET ORAL at 01:16

## 2020-11-28 RX ADMIN — DOCUSATE SODIUM 50 MG AND SENNOSIDES 8.6 MG 1 TABLET: 8.6; 5 TABLET, FILM COATED ORAL at 16:28

## 2020-11-28 RX ADMIN — MAGNESIUM CITRATE 296 ML: 1.75 LIQUID ORAL at 09:30

## 2020-11-28 RX ADMIN — OXYCODONE HYDROCHLORIDE AND ACETAMINOPHEN 2 TABLET: 5; 325 TABLET ORAL at 20:34

## 2020-11-28 RX ADMIN — CYCLOBENZAPRINE 10 MG: 10 TABLET, FILM COATED ORAL at 09:12

## 2020-11-28 RX ADMIN — CYCLOBENZAPRINE 10 MG: 10 TABLET, FILM COATED ORAL at 17:52

## 2020-11-28 RX ADMIN — OXYCODONE HYDROCHLORIDE AND ACETAMINOPHEN 1 TABLET: 5; 325 TABLET ORAL at 12:17

## 2020-11-28 RX ADMIN — BISACODYL 5 MG: 5 TABLET, COATED ORAL at 16:27

## 2020-11-28 RX ADMIN — DOCUSATE SODIUM 50 MG AND SENNOSIDES 8.6 MG 1 TABLET: 8.6; 5 TABLET, FILM COATED ORAL at 20:34

## 2020-11-28 RX ADMIN — CEFAZOLIN SODIUM 2 G: 10 INJECTION, POWDER, FOR SOLUTION INTRAVENOUS at 01:16

## 2020-11-28 ASSESSMENT — PAIN SCALES - GENERAL
PAINLEVEL_OUTOF10: 4
PAINLEVEL_OUTOF10: 6
PAINLEVEL_OUTOF10: 8
PAINLEVEL_OUTOF10: 7
PAINLEVEL_OUTOF10: 7
PAINLEVEL_OUTOF10: 8
PAINLEVEL_OUTOF10: 9

## 2020-11-28 ASSESSMENT — PAIN DESCRIPTION - ORIENTATION
ORIENTATION: MID
ORIENTATION: MID

## 2020-11-28 ASSESSMENT — PAIN DESCRIPTION - LOCATION
LOCATION: BACK

## 2020-11-28 ASSESSMENT — PAIN DESCRIPTION - DIRECTION: RADIATING_TOWARDS: DOWN BACK

## 2020-11-28 ASSESSMENT — PAIN DESCRIPTION - FREQUENCY
FREQUENCY: CONTINUOUS
FREQUENCY: CONTINUOUS

## 2020-11-28 ASSESSMENT — PAIN DESCRIPTION - ONSET
ONSET: ON-GOING
ONSET: ON-GOING

## 2020-11-28 ASSESSMENT — PAIN DESCRIPTION - PAIN TYPE
TYPE: SURGICAL PAIN
TYPE: SURGICAL PAIN

## 2020-11-28 ASSESSMENT — PAIN - FUNCTIONAL ASSESSMENT: PAIN_FUNCTIONAL_ASSESSMENT: ACTIVITIES ARE NOT PREVENTED

## 2020-11-28 ASSESSMENT — PAIN DESCRIPTION - PROGRESSION: CLINICAL_PROGRESSION: GRADUALLY WORSENING

## 2020-11-28 ASSESSMENT — PAIN DESCRIPTION - DESCRIPTORS
DESCRIPTORS: SHARP
DESCRIPTORS: DISCOMFORT

## 2020-11-28 NOTE — PROGRESS NOTES
Patient ambulatory in halls with spouse and staff. Gait steady. Cognition intact. Wears abdominal binder when out of bed. States that back brace ordered is still at [facility]. Patient able to rate pain on SHAILA scale and offers repositioning as alternative to opioids. Patient states that he only prefers to take opioids when pain he extreme. Patients connor catheter removed this morning and is voiding. Did c/o bilateral groin pain at first but states this has since subsided. Patient is passing flatus but has yet to have bowel movement following bottle of magnesium citrate. Patient states that when he has occasional constipation at home, it is relieved with magnesium citrate and he refused all other scheduled bowel medications this morning. Patients dressing changed as it had become saturated. Once removed, both drains noted to be out of place. Neither hemovac drain would stay compressed. Dressing did have serosanguinous drainage present on previous dressing that was changed. Replaced with 2 sterile 4 x 4's and covered with tegaderm. Area is closed with surgical glue and well approximated. NNo further drainage from drain sites. Patient tolerated well.

## 2020-11-28 NOTE — DISCHARGE INSTR - ACTIVITY
· Rest when you feel tired. · Get some physical activity every day, but do not get too tired. Continue to wear clifton hose for 6 weeks after surgery. Wear them during the day and remove at night.

## 2020-11-28 NOTE — DISCHARGE INSTR - OTHER ORDERS
Get in touch with :  [facility] for [facility]   [address]  [place], [address]   [phone]  Regarding brace that was ordered by Dr. Tone Ford.

## 2020-11-28 NOTE — PROGRESS NOTES
currently working from home due to [address] [place] [address] Street:  Range of Motion:  Right Lower Extremity: WNL  Left Lower Extremity: WNL    Strength:  Right Lower Extremity: WNL  Left Lower Extremity: WNL    Balance:  Static Standing Balance: Modified Independent  Dynamic Standing Balance: Supervision    Bed Mobility:  Supine to Sit: Supervision, completed with HOB flat and use of log roll  Sit to Supine: Supervision , completed with HOB flat and use of log roll    Transfers:  Sit to Stand: Supervision  Stand to Sit:Supervision    Ambulation:  Supervision  Distance: 40', 100', 150'  Surface: Level Tile  Device:Rolling Walker  Gait Deviations:  Decreased Step Length Bilaterally and Decreased Gait Speed    Stairs:  Supervision, with verbal cues , with increased time for completion  Number of Steps: 4  Height: 6\" step with One Handrail        Functional Outcome Measures: Completed  AM-PAC Inpatient Mobility Raw Score : 19  AM-PAC Inpatient T-Scale Score : 45.44    ASSESSMENT:  Activity Tolerance:  Patient tolerance of  treatment: good. Treatment Initiated: Treatment and education initiated within context of evaluation. Evaluation time included review of current medical information, gathering information related to past medical, social and functional history, completion of standardized testing, formal and informal observation of tasks, assessment of data and development of plan of care and goals. Treatment time included skilled education and facilitation of tasks to increase safety and independence with functional mobility for improved independence and quality of life. Assessment:  Assessment: Patient tolerated PT session well and is motivated to participate. He is able to complete mobility with supervision and a RW this date, and does well following back precautions throughout. He reports he has been ambulating multiple times per day already with his spouse.   Reviewed/went through back precautions, bed mobility and stairs. Pt denies further PT needs or concerns at this time, will sign off. Discussed continuing to ambulate in halls at least 3x/day, pt verbalized understanding. Prognosis: Excellent    REQUIRES PT FOLLOW UP: No    Discharge Recommendations:  Discharge Recommendations: Home with assist PRN    Patient Education:  PT Education: Plan of Care, Precautions, Functional Mobility Training    Equipment Recommendations:  Equipment Needed: Yes  Mobility Devices: Paulette Tiffany: Rolling    Plan:  Times per week: D/C PT    Goals:  Patient goals : Go home  Short term goals  Time Frame for Short term goals: n/a  Long term goals  Time Frame for Long term goals : n/a    Following session, patient left in safe position with all fall risk precautions in place.     Td Villa, PT, DPT

## 2020-11-28 NOTE — PROGRESS NOTES
[address]  INPATIENT OCCUPATIONAL THERAPY  [facility] 7K  EVALUATION    Time:   Time In: 5970  Time Out: 5538  Timed Code Treatment Minutes: 10 Minutes  Minutes: 25          Date: 2020  Patient Name: Michelle Lui,   Gender: male      MRN: [id number]  : 1985  (28 y.o.)  Referring Practitioner: Vannesa Mcclain MD  Diagnosis: Lumbosacral spinal stenosis  Additional Pertinent Hx: The patient is a 43-year-old male with c/o left-sided lumbar pain that radiates pain, numbness and tingling into the left buttock, posterior, lateral and anterior thigh. Patient also has numbness in the left foot and first two toes. On  pt underwent L3-S1 bilateral laminectomy, partial medial facetectomies and foraminotomies of L3, L4, L5, and S1 nerve roots and PSF with instrumentation. Restrictions/Precautions:  Restrictions/Precautions: Surgical Protocols, General Precautions  Required Braces or Orthoses  Spinal: Lumbar Corset    Subjective       Subjective: Patient states his wife used to work as an occupational therapy assistant.     Pain:  Pain Assessment  Patient Currently in Pain: Yes  Pain Assessment: 0-10  Pain Level: 7  Pain Location: Back    Social/Functional History:  Lives With: Spouse  Type of Home: House  Home Layout: One level(2 steps)  Home Access: Stairs to enter without rails   Bathroom Shower/Tub: Tub/Shower unit  Bathroom Toilet: Standard(sink nearby)  Bathroom Equipment: Hand-held shower       ADL Assistance: Independent  Homemaking Responsibilities: (mows grass, has some help for [employer])  Ambulation Assistance: Independent  Transfer Assistance: Independent       Occupation: Full time employment  Type of occupation:  currently working from home due to [employer]       VISION:WFL    HEARING:  WFL    COGNITION: WFL    RANGE OF MOTION:  Right Upper Extremity: WFL  Left Upper Extremity:  WFL    STRENGTH:  Not tested due to recent surgery    ADL:   Feeding: week: 3-5 x week  Current Treatment Recommendations: Self-Care / ADL, Functional Mobility Training, Endurance Training. See long-term goal time frame for expected duration of plan of care. If no long-term goals established, a short length of stay is anticipated. Goals:  Patient goals : Decrease pain  Short term goals  Time Frame for Short term goals: 1 week  Short term goal 1: Patient will complete LE dressing with adaptive equipment as needed and supervision. Short term goal 2: Patient will complete functional transfers including toilet transfers with modified independence. Short term goal 3: Patient will complete standing ADL with good safety x 8 minutes for increased endurance. Long term goals  Time Frame for Long term goals : No LTGs set secondary to short ELOS/         Following session, patient left in safe position with all fall risk precautions in place.

## 2020-11-28 NOTE — PROGRESS NOTES
Department of Orthopedic Surgery  Spine Service  Attending Progress Note        Subjective:  Patient doing well, back pain controlled. Denies leg pain or numbness/tingling. Ambulated in halls this am. No BM. Abdullahi removed this morning and patient voided on own. Hgb 11.8. Hemovac drains not staying compressed. Vitals  VITALS:  /67   Pulse 77   Temp 97.9 °F (36.6 °C) (Oral)   Resp 18   Ht 6' (1.829 m)   Wt 258 lb 3.2 oz (117.1 kg)   SpO2 96%   BMI 35.02 kg/m²   24HR INTAKE/OUTPUT:      Intake/Output Summary (Last 24 hours) at 11/28/2020 0856  Last data filed at 11/28/2020 0515  Gross per 24 hour   Intake 7567 ml   Output 7435 ml   Net 132 ml     URINARY CATHETER OUTPUT (Abdullahi):  [REMOVED] Urethral Catheter-Output (mL): 900 mL  DRAIN/TUBE OUTPUT:  Closed/Suction Drain Inferior; Left Back Accordion-Output (ml): 40 ml  Closed/Suction Drain Inferior;Right Back Accordion-Output (ml): 80 ml      PHYSICAL EXAM:    Orientation:  alert and oriented to person, place and time    Incision:  dressing in place, clean, dry, intact      Lower Extremity Motor :  quadriceps, extensor hallucis longus, dorsiflexion, plantarflexion 5/5 bilaterally  Lower Extremity Sensory:  Intact L1-S1    Flatus:  positive    ABNORMAL EXAM FINDINGS:  none    LABS:    HgB:    Lab Results   Component Value Date    HGB 11.8 11/28/2020     Hemoglobin/Hematocrit:    Lab Results   Component Value Date    HGB 11.8 11/28/2020    HCT 34.7 11/28/2020     BMP:    Lab Results   Component Value Date     11/28/2020    K 4.1 11/28/2020     11/28/2020    CO2 25 11/28/2020    BUN 11 11/28/2020    CREATININE 0.9 11/28/2020    CALCIUM 8.9 11/28/2020    LABGLOM >90 11/28/2020    GLUCOSE 103 11/28/2020       ASSESSMENT AND PLAN:    Post operative day 1 status post L3-S1 decompression and fusion with L5-S1 TLIF    1:  Monitor labs and drain output  2:  Activity Level:  As tolerated.  PT/OT  3:  Pain Control:  Good  4:  Discharge Planning:  Pending- possibly home tomorrow. 5:  Bladder scan and straight cath prn  6:  Back brace ordered from [place], using abdominal binder for now.      Roselia Salas

## 2020-11-28 NOTE — FLOWSHEET NOTE
11/28/20 1144   Provider Notification   Reason for Communication Review case   Provider Name LORY Monroe   Provider Notification Advance Practice Clinician (CNS, NP, CNM, CRNA, PA)   Method of Communication Secure Message   Response See orders   Notification Time [id number]   Shift Event Other (comment)   Patients drains will not stay compressed. Right sided one appears to be out and has saturated dressing. Can I change dressing and remove drain? Okay to remove right sided drain and apply dry sterile dressing. Addendum to PA:   now c/o bilateral groin pain  Read 1:51 PM     11/28/20 1:50 PM   nevermind, he just says it stopped. Read 1:51 PM     11/28/20 1:52 PM   both drains were out when I removed dressing.  neither was sutured in place

## 2020-11-28 NOTE — DISCHARGE INSTR - PHARMACY
· Be safe with medicines. If your doctor prescribed medicine, take it exactly as prescribed. Call your doctor if you think you are having a problem with your medicine. You will get more details on the specific medicines your doctor prescribes. Unused medications, especially pain medications, should be disposed to ensure medications do not end up being misused in the future, as well as helping to ensure drugs are not impacting the environment. [facility] and many local police agencies have medication take-back bins to properly destroy these medications. Please see the site https://apps. [employer]iversion. [employer].gov/pubdispsearch/spring/main?execution=e2s1 for additional take-back bins located in your area.

## 2020-11-28 NOTE — CONSULTS
Hospital Medicine Consult    Patient:  Austin Porter  MRN: [id number]    Referring Physician: Dr Kia Pressley  Reason for Consult:post op medical care  Primacy Care Physician: Angie Bean MD    HISTORY OF PRESENT ILLNESS:   The patient is a 28 y.o. male with recurrent lower back pain from L3-S1 DDD/ stenosis, L3-5 disc prolapse and prior L5-S1 discectomy admitted for L3-S1 decompression and PSF. Seen for post op care. No legs weakness, no HA, no CP, no SOB. Past Medical History:        Diagnosis Date    Arthritis     back       Past Surgical History:        Procedure Laterality Date    BACK SURGERY  2010    Microsiskectomy L5-S1    WISDOM TOOTH EXTRACTION  2006       Medications: Scheduled Meds:   sodium chloride flush  10 mL Intravenous 2 times per day    sennosides-docusate sodium  1 tablet Oral BID    bisacodyl  5 mg Oral Daily    polyethylene glycol  17 g Oral Daily     Continuous Infusions:  PRN Meds:.sodium chloride flush, promethazine **OR** ondansetron, oxyCODONE-acetaminophen **OR** oxyCODONE-acetaminophen, morphine **OR** morphine, cyclobenzaprine, bisacodyl, fleet    Allergies:  Patient has no known allergies. Social History:   TOBACCO:   reports that he quit smoking about 7 years ago. His smoking use included cigarettes. He quit after 10.00 years of use. He has quit using smokeless tobacco.  ETOH:   reports current alcohol use of about 12.0 standard drinks of alcohol per week.   OCCUPATION:      Family History:       Problem Relation Age of Onset    Colon Cancer Paternal Uncle     Colon Cancer Maternal Grandfather        Physical Exam:    Vitals: /60   Pulse 86   Temp 97.8 °F (36.6 °C) (Oral)   Resp 16   Ht 6' (1.829 m)   Wt 258 lb 3.2 oz (117.1 kg)   SpO2 96%   BMI 35.02 kg/m²   General appearance: alert, appears stated age and cooperative  Skin: Skin color, texture, turgor normal. No rashes or lesions  HEENT: Head: atraumatic  Neck: no adenopathy, no carotid bruit and no JVD  Lungs: clear to auscultation bilaterally  Heart: S1, S2 normal  Abdomen: soft, non-tender; bowel sounds normal; no masses,  no organomegaly  Extremities: no edema, redness or tenderness in the calves or thighs  Neurologic: Mental status: Alert, oriented, thought content appropriate    CBC:   Recent Labs     11/28/20  0724   HGB 11.8*     BMP:    Recent Labs     11/28/20  0724      K 4.1      CO2 25   BUN 11   CREATININE 0.9   GLUCOSE 103       Assessment and Plan   1. L3-S1 DDD/ spinal stenosis s/p L3-S1 decompression and PSF  2. Mild post op anemia    Cont post op care  Bowel regimen  SCD. Thank you for the consult Dr Aaliyah Vera.     Patient Active Problem List   Diagnosis Code    Lumbosacral spinal stenosis M48.07       Rachid Sheth MD  Consulting Internist.  11/28/2020

## 2020-11-29 VITALS
DIASTOLIC BLOOD PRESSURE: 62 MMHG | TEMPERATURE: 99.1 F | HEIGHT: 72 IN | RESPIRATION RATE: 16 BRPM | BODY MASS INDEX: 34.97 KG/M2 | HEART RATE: 87 BPM | OXYGEN SATURATION: 96 % | SYSTOLIC BLOOD PRESSURE: 107 MMHG | WEIGHT: 258.2 LBS

## 2020-11-29 LAB
ANION GAP SERPL CALCULATED.3IONS-SCNC: 9 MEQ/L (ref 8–16)
BUN BLDV-MCNC: 11 MG/DL (ref 7–22)
CALCIUM SERPL-MCNC: 9.1 MG/DL (ref 8.5–10.5)
CHLORIDE BLD-SCNC: 101 MEQ/L (ref 98–111)
CO2: 29 MEQ/L (ref 23–33)
CREAT SERPL-MCNC: 0.9 MG/DL (ref 0.4–1.2)
GFR SERPL CREATININE-BSD FRML MDRD: > 90 ML/MIN/1.73M2
GLUCOSE BLD-MCNC: 115 MG/DL (ref 70–108)
HCT VFR BLD CALC: 33 % (ref 42–52)
HEMOGLOBIN: 11.1 GM/DL (ref 14–18)
POTASSIUM SERPL-SCNC: 4.2 MEQ/L (ref 3.5–5.2)
SODIUM BLD-SCNC: 139 MEQ/L (ref 135–145)

## 2020-11-29 PROCEDURE — 6370000000 HC RX 637 (ALT 250 FOR IP): Performed by: PHYSICIAN ASSISTANT

## 2020-11-29 PROCEDURE — 36415 COLL VENOUS BLD VENIPUNCTURE: CPT

## 2020-11-29 PROCEDURE — 80048 BASIC METABOLIC PNL TOTAL CA: CPT

## 2020-11-29 PROCEDURE — 85014 HEMATOCRIT: CPT

## 2020-11-29 PROCEDURE — 85018 HEMOGLOBIN: CPT

## 2020-11-29 RX ORDER — OXYCODONE HYDROCHLORIDE AND ACETAMINOPHEN 5; 325 MG/1; MG/1
1 TABLET ORAL EVERY 6 HOURS PRN
Qty: 28 TABLET | Refills: 0 | Status: SHIPPED | OUTPATIENT
Start: 2020-11-29 | End: 2020-12-06

## 2020-11-29 RX ORDER — CYCLOBENZAPRINE HCL 10 MG
10 TABLET ORAL 3 TIMES DAILY PRN
Qty: 30 TABLET | Refills: 0 | Status: SHIPPED | OUTPATIENT
Start: 2020-11-29 | End: 2020-12-09

## 2020-11-29 RX ADMIN — OXYCODONE HYDROCHLORIDE AND ACETAMINOPHEN 1 TABLET: 5; 325 TABLET ORAL at 09:22

## 2020-11-29 RX ADMIN — BISACODYL 5 MG: 5 TABLET, COATED ORAL at 08:47

## 2020-11-29 RX ADMIN — CYCLOBENZAPRINE 10 MG: 10 TABLET, FILM COATED ORAL at 12:14

## 2020-11-29 RX ADMIN — BISACODYL 10 MG: 10 SUPPOSITORY RECTAL at 09:52

## 2020-11-29 RX ADMIN — POLYETHYLENE GLYCOL 3350 17 G: 17 POWDER, FOR SOLUTION ORAL at 06:32

## 2020-11-29 RX ADMIN — MAGNESIUM HYDROXIDE 30 ML: 2400 SUSPENSION ORAL at 08:47

## 2020-11-29 RX ADMIN — CYCLOBENZAPRINE 10 MG: 10 TABLET, FILM COATED ORAL at 02:35

## 2020-11-29 RX ADMIN — OXYCODONE HYDROCHLORIDE AND ACETAMINOPHEN 2 TABLET: 5; 325 TABLET ORAL at 02:35

## 2020-11-29 RX ADMIN — DOCUSATE SODIUM 50 MG AND SENNOSIDES 8.6 MG 1 TABLET: 8.6; 5 TABLET, FILM COATED ORAL at 08:47

## 2020-11-29 ASSESSMENT — PAIN SCALES - GENERAL
PAINLEVEL_OUTOF10: 7
PAINLEVEL_OUTOF10: 4
PAINLEVEL_OUTOF10: 6

## 2020-11-29 NOTE — PROGRESS NOTES
Department of Orthopedic Surgery  Spine Service  Attending Progress Note        Subjective:  Patient continues to do well, ambulated in halls this am. No leg pain or numbness/tingling. Voiding without issue. No BM. Hemovac drains pulled yesterday and dressing changed. Dressing dry this morning. Hgb 11.1. Vitals  VITALS:  /62   Pulse 87   Temp 99.1 °F (37.3 °C) (Oral)   Resp 16   Ht 6' (1.829 m)   Wt 258 lb 3.2 oz (117.1 kg)   SpO2 96%   BMI 35.02 kg/m²   24HR INTAKE/OUTPUT:      Intake/Output Summary (Last 24 hours) at 11/29/2020 0743  Last data filed at 11/29/2020 0326  Gross per 24 hour   Intake 500 ml   Output 100 ml   Net 400 ml     URINARY CATHETER OUTPUT (Abdullahi):  [REMOVED] Urethral Catheter-Output (mL): 900 mL  DRAIN/TUBE OUTPUT:  [REMOVED] Closed/Suction Drain Inferior; Left Back Accordion-Output (ml): 40 ml  [REMOVED] Closed/Suction Drain Inferior;Right Back Accordion-Output (ml): 60 ml      PHYSICAL EXAM:    Orientation:  alert and oriented to person, place and time    Incision:  dressing in place, clean, dry, intact      Lower Extremity Motor :  quadriceps, extensor hallucis longus, dorsiflexion, plantarflexion 5/5 bilaterally  Lower Extremity Sensory:  Intact L1-S1    Flatus:  positive    ABNORMAL EXAM FINDINGS:  none    LABS:    HgB:    Lab Results   Component Value Date    HGB 11.1 11/29/2020     Hemoglobin/Hematocrit:    Lab Results   Component Value Date    HGB 11.1 11/29/2020    HCT 33.0 11/29/2020     BMP:    Lab Results   Component Value Date     11/29/2020    K 4.2 11/29/2020     11/29/2020    CO2 29 11/29/2020    BUN 11 11/29/2020    CREATININE 0.9 11/29/2020    CALCIUM 9.1 11/29/2020    LABGLOM >90 11/29/2020    GLUCOSE 115 11/29/2020       ASSESSMENT AND PLAN:    Post operative day 2 status post L3-S1 decompression and fusion with L5-S1 TLIF    1:  Monitor labs   2:  Activity Level:  As tolerated.  PT/OT  3:  Pain Control:  Good  4:  Discharge Planning:  Pending [place], [place]

## 2020-11-29 NOTE — DISCHARGE INSTR - LAB
programs and medicines. Check with your doctor about whether you should use hormone forms of birth control or hormone therapy. These may increase your risk of blood clots. Use compression stockings if your doctor prescribes them. These are specially fitted stockings that may prevent blood clots by keeping blood from pooling in your legs.

## 2020-11-29 NOTE — FLOWSHEET NOTE
11/28/20 2345   Incentive Spirometry Tx   Respiratory Effort Accessory Muscle Use   Treatment Tolerance Well   Incentive Spirometry Goal (mL) 2500 mL   Incentive Spirometry Achieved (mL) 2000 mL     This RN educated patient on how to properly use incentive spirometer. Return demonstration performed. Patient educated on why usage of the IS is important. Patient verbalizes understanding. All questions answered at this time.

## 2020-11-29 NOTE — FLOWSHEET NOTE
11/29/20 0831   Provider Notification   Reason for Communication Review case;Patient request   Provider Name LORY Monroe   Provider Notification Advance Practice Clinician (CNS, NP, CNM, CRNA, PA)   Method of Communication Secure Message   Response Waiting for response   Notification Time 0831   Shift Event Other (comment)   Sandra has been in and seen patient. wrote order to discharge after BM, he had mag citrate yesterday and small amount BM. does not want suppository or enema. can we try a dose of MOM?

## 2020-12-01 NOTE — DISCHARGE SUMMARY
Discharge Summary        Date of Admission: 11/27/2020  Date of Discharge: 11/29/2020    Admitting Physician: Dr. Irma Allen: SANNA     DATE OF PROCEDURE: 11/27/2020     PREOPERATIVE DIAGNOSES:  1.  Prior L5-S1 discectomy  2. L3-S1 degenerative disc disease. 3.  L3-5 disc extrusions  4.  L3-S1 lumbar stenosis with radiculopathy  5.  Obesity, BMI 35.02     POSTOPERATIVE DIAGNOSES:  1.  Prior L5-S1 discectomy  2. L3-S1 degenerative disc disease. 3.  L3-5 disc extrusions  4.  L3-S1 lumbar stenosis with radiculopathy  5.  Obesity, BMI 35.02     OPERATION PERFORMED:  1.  L3-S1 bilateral laminectomy, partial medial facetectomies and foraminotomies of L3, L4, L5, and S1 nerve roots   2.  L3-S1 posterior spinal fusion. 3.  L3-S1 posterior spinal instrumentation, Legacy, [employer]str. 4. L4-5 transforaminal lumbar interbody fusion, anterior posterior fusion using a single posterior approach  5.  12 x 25 mm PEEK [place] cage  6.  Use of local autograft bone and Beba Matrix      SURGEON: Maxim Hirsch MD     INDICATIONS:  This is a 28 y.o. male with refractory back and left leg pain, numbness and tingling from degenerative disc disease and lumbar stenosis from L3-S1.  Patient has failed full conservative therapy including medication management, physical therapy, and epidural steroid injections. Due to the persistence of symptoms and reduction in the ADLs, patient elected surgical treatment. Patient, therefore, understood indications for the surgery as well as its risks, benefits, and alternatives.  These risks include but are not limited to paralysis, infection, hematoma, dural tear, nerve root injury, nonunion, DVT/PE, stroke, MI, death etc.  All questions were answered. Informed consent was obtained. HOSPITAL COURSE:   The patient was admitted on the above date had the above procedure performed. Patient had no complications during surgery.  The patient was then transferred to the PACU and to a regular nursing floor for postop care. The patient's pain was initially controlled with IV medications, but we were able to transition to oral pain medications soon after arrival to the floor. Their pain remained under good control through their hospital stay. Internal Medicine was consulted for medical management. Patient had improvement of their radicular symptoms. He was up and ambulating with PT on POD#1. Abdullahi removed and pt able to void on own. Hemovac drains were accidentally pulled in the afternoon. POD#2 patient continued to do well. He was working well with PT, no leg pain or numbness/tingling. Hgb stable 11. The patient had a bowel movement before discharge. The patient was seen by Physical Therapy and was found to be an ideal candidate for discharge to home. The patient was then safely discharged to home on the above date. PHYSICAL EXAMINATION ON DISCHARGE:   The patient was afebrile, stable. Dressing was clean, dry and intact. 5/5 strength in bilateral lower extremities. Sensory intact to touch LE. DISCHARGE INSTRUCTIONS:   Patient can resume regular diet. Resume home medications except for NSAIDs or blood thinners. Resume asa POD#2 and all other blood thinners POD#5. Okay to resume NSAIDs 6 months after surgery. Take previously prescribed narcotic pain medications as directed. Change the dressing daily until the incision is clean and dry and then leave open to air. Okay to take a shower without submerging the incision. Wear the brace at all times when up and ambulating. Limit any heavy lifting, bending or twisting until first postoperative visit. Patient educated on signs/symptoms of hematoma and to call office or go to ED. Patient to follow up with Dr. Kimmie Clements 6 weeks post discharge as scheduled.     CONDITION AT DISCHARGE:  Stable    DISPOSITION:   Home    Arnoldo Topete PA-C

## (undated) DEVICE — TOTAL TRAY, DB, 100% SILI FOLEY, 16FR 10: Brand: MEDLINE

## (undated) DEVICE — BLADE CLIPPER GEN PURP NS

## (undated) DEVICE — LINER SUCT CANSTR 1500CC SEMI RIG W/ POR HYDROPHOBIC SHUT

## (undated) DEVICE — GOWN,AURORA,NON-REINFORCED,2XL: Brand: MEDLINE

## (undated) DEVICE — GLOVE ORANGE PI 8 1/2   MSG9085

## (undated) DEVICE — BIPOLAR SEALER 23-112-1 AQM 6.0: Brand: AQUAMANTYS ®

## (undated) DEVICE — GLOVE ORANGE PI 7   MSG9070

## (undated) DEVICE — PROBE STIM 3 MM FOR PEDCL SCREW DISP

## (undated) DEVICE — HEAD POSITIONER, SURGICAL: Brand: DEROYAL

## (undated) DEVICE — PAD,NON-ADHERENT,3X8,STERILE,LF,1/PK: Brand: MEDLINE

## (undated) DEVICE — JCKSON TBL POSTNER NO HD REST: Brand: MEDLINE INDUSTRIES, INC.

## (undated) DEVICE — GLOVE ORANGE PI 8   MSG9080

## (undated) DEVICE — BASIC SINGLE BASIN BTC-LF: Brand: MEDLINE INDUSTRIES, INC.

## (undated) DEVICE — SPONGE GZ W4XL4IN COT 12 PLY TYP VII WVN C FLD DSGN

## (undated) DEVICE — GLOVE SURG SZ 85 L12IN FNGR ORTHO 126MIL CRM LTX FREE

## (undated) DEVICE — SET AUTOTRNS C175ML BOWL BTM OUTLT RESERVOIRXTRA

## (undated) DEVICE — GOWN,SIRUS,NONRNF,SETINSLV,XL,20/CS: Brand: MEDLINE

## (undated) DEVICE — YANKAUER,BULB TIP,W/O VENT,RIGID,STERILE: Brand: MEDLINE

## (undated) DEVICE — LINE ASPIR 1/4 ANTICOAG

## (undated) DEVICE — BLADE ES L4IN INSUL EDGE

## (undated) DEVICE — AGENT HEMOSTATIC SURGIFLOW MATRIX KIT W/THROMBIN

## (undated) DEVICE — Device

## (undated) DEVICE — GOWN,SIRUS,NON REINFRCD,LARGE,SET IN SL: Brand: MEDLINE

## (undated) DEVICE — CODMAN® SURGICAL PATTIES 1" X 1" (2.54CM X 2.54CM): Brand: CODMAN®

## (undated) DEVICE — 4.0MM PRECISION ROUND

## (undated) DEVICE — THE MILL DISPOSABLE - MEDIUM

## (undated) DEVICE — AGENT HEMSTAT W2XL4IN OXIDIZED REGENERATED CELOS ABSRB SFT

## (undated) DEVICE — DRESSING TRNSPAR W8XL12IN FLM SURESITE 123

## (undated) DEVICE — GAUZE,SPONGE,8"X4",12PLY,XRAY,STRL,LF: Brand: MEDLINE

## (undated) DEVICE — KIT EVAC 400CC PVC RADPQ Y CONN

## (undated) DEVICE — GLOVE ORANGE PI 7 1/2   MSG9075

## (undated) DEVICE — DRAIN SURG 10FR PVC TB W/ TRCR MID PERF NO RESVR HUBLESS